# Patient Record
Sex: FEMALE | Race: BLACK OR AFRICAN AMERICAN | Employment: PART TIME | URBAN - METROPOLITAN AREA
[De-identification: names, ages, dates, MRNs, and addresses within clinical notes are randomized per-mention and may not be internally consistent; named-entity substitution may affect disease eponyms.]

---

## 2021-10-12 ENCOUNTER — VIRTUAL VISIT (OUTPATIENT)
Dept: NEUROLOGY | Facility: CLINIC | Age: 21
End: 2021-10-12
Payer: COMMERCIAL

## 2021-10-12 VITALS — HEIGHT: 60 IN | WEIGHT: 136 LBS | BODY MASS INDEX: 26.7 KG/M2

## 2021-10-12 DIAGNOSIS — F07.81 POST CONCUSSION SYNDROME: Primary | ICD-10-CM

## 2021-10-12 PROCEDURE — 99205 OFFICE O/P NEW HI 60 MIN: CPT | Mod: 95 | Performed by: NURSE PRACTITIONER

## 2021-10-12 RX ORDER — NORETHINDRONE ACETATE AND ETHINYL ESTRADIOL 1.5-30(21)
1 KIT ORAL
COMMUNITY
Start: 2021-08-11

## 2021-10-12 RX ORDER — METOCLOPRAMIDE HYDROCHLORIDE 5 MG/5ML
SOLUTION ORAL
COMMUNITY
Start: 2021-09-30

## 2021-10-12 RX ORDER — DEXTROAMPHETAMINE SACCHARATE, AMPHETAMINE ASPARTATE MONOHYDRATE, DEXTROAMPHETAMINE SULFATE AND AMPHETAMINE SULFATE 5; 5; 5; 5 MG/1; MG/1; MG/1; MG/1
CAPSULE, EXTENDED RELEASE ORAL
COMMUNITY
Start: 2021-07-30

## 2021-10-12 ASSESSMENT — MIFFLIN-ST. JEOR: SCORE: 1293.39
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
## 2021-10-12 NOTE — PROGRESS NOTES
Research shows that Ibuprofen can sometimes cause a rebound headache (headache improves for a short time but then comes back worse). Tylenol/Excedrin in studies have shown to be more beneficial. Excedrin does contain caffeine so do not take up to 6 hours before going to sleep     Wearing a baseball hat or any hat with a brim, when outside or inside when there is overhead lighting    Wear blue blocking lenses- glasses that have a blue/pink tint to the lens (can be found at places like [employer], or optical [employer])   There is a Kiosk in the [employer] ([address] [facility]) on the lower level next to the carousel that sells sunglasses 2 pair for 10 dollars. [employer] is a web site wear you can pick out frames and have the tint added to the lens (make sure you are tinting the lenses not the frames)      -a light pair for when inside, looking at a computer or night time driving      - a darker pair for when outside and it is fran    Eye drops - saline solution for dry eyes, put 2 drops in each eye multiple times a day to help with the dry tired eyes    Take out any overhead lighting and replace with lamps    Work in an office or at home when available     Make sure you are taking breaks before symptoms worsen, I suggest a 2-5 minute break every hour and then again if symptoms feel to be worsening. You are better taking shorter breaks more frequently then you are taking fewer long breaks. If you need a hour break or need to take a nap to help your symptoms, you have waited too long to take a break.    Breaks are breaks from stimulation- an area where you can't hear, see, smell, taste or feel anything     Homeopathic Remedies   Sleep-  Melatonin 1-5 mg  Magnesium, 400 mg (will also help with leg cramping)  Valerian Root  Lithium Orotate  CBD oil (careful make sure it does not contain THC)    Pain-  Deep Blue Rub (essential oil lotion, can be purchased off amazon)  CBD lotion    Places to  "purchase CBD oil that other patients have liked are    [employer] [address]  [place], [place]    [employer] (2 locations)      -[address] [place], [place]     -[address], [address]                [place], [place]    [employer]  Order on line      Limit physical activity as well as activities that require a lot of thinking or concentration. These activities can make symptoms worse and recovery time longer. In some cases, your doctor may prescribe time that you completely eliminate these activities to allow complete \"brain rest.\"  Physical activity includes going to the gym, sports practices, weight-training, running, exercising, heavy lifting, etc.    Thinking and concentration activities (e.g., cell phone texting, computer games, movies, parties, loud music and in severe cases may include limiting your time at work).    Drink lots of fluids and eat carbohydrates or protein to main appropriate blood sugar levels.    As symptoms decrease, with consent from your doctor, you may begin to gradually return to your daily activities. If symptoms worsen or return, lessen your activities, then try again to increase your activities gradually.     During recovery, it is normal to feel frustrated and sad when you do not feel right and you can't be as active as usual.    Repeated evaluation of your symptoms is recommended to help guide recovery. Please follow up as recommended by your doctor to ensure a safe and healthy recovery.    Watch for and go to the Emergency Department if you have any of the following symptoms:  Headaches that significantly worsen  Looks very drowsy or can't be awakened  Can't recognize people or places  Worsening neck pain  Seizures  Repeated vomiting  Increasing confusion or irritability  Unusual behavioral change  Slurred speech  Weakness or numbness in arms/legs  Change in state of consciousness    For more information, please visit on the " Internet:  http://www.cdc.gov/concussion/get_help.html   http://www.cdc.gov/concussion/pdf/Facts_about_Concussion_TBI-a.pdf      General Information:  Today you had your appointment with Elba Marroquin CNP     If lab work was done today as part of your evaluation you will generally be contacted via My Chart, mail, or phone with the results within 1-5 days. If there is an alarming result we will contact you by phone. Lab results come back at varying times, I generally wait until all labs are resulted before making comments on results. Please note labs are automatically released to My Chart once available.     If you need refills please contact your pharmacist. They will send a refill request to me to review. Please allow 3 business days for us to process all refill requests.     Please call or send a medical message through My Chart, with any questions or concerns    If you need any paperwork completed please fax forms to [phone]. Please state if you would like a copy of the completed paperwork, mailed or faxed back to the patient and a fax number to fax the paperwork to. Please allow up to 10 days for paperwork to be completed.
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
## 2021-10-12 NOTE — NURSING NOTE
Chief Complaint   Patient presents with     Concussion     Sophia Owens MA on 10/12/2021 at 7:54 AM

## 2025-08-18 ENCOUNTER — APPOINTMENT (OUTPATIENT)
Dept: URBAN - METROPOLITAN AREA CLINIC 252 | Age: 25
Setting detail: DERMATOLOGY
End: 2025-08-18

## 2025-08-18 VITALS — WEIGHT: 140 LBS | HEIGHT: 60 IN | RESPIRATION RATE: 16 BRPM

## 2025-08-18 DIAGNOSIS — L21.8 OTHER SEBORRHEIC DERMATITIS: ICD-10-CM

## 2025-08-18 RX ORDER — KETOCONAZOLE 20 MG/ML
SHAMPOO, SUSPENSION TOPICAL
Qty: 120 | Refills: 3 | Status: ERX | COMMUNITY
Start: 2025-08-18

## 2025-08-18 RX ORDER — CLOBETASOL PROPIONATE 0.5 MG/ML
SOLUTION TOPICAL BID
Qty: 50 | Refills: 1 | Status: ERX | COMMUNITY
Start: 2025-08-18

## 2025-08-18 ASSESSMENT — LOCATION ZONE DERM: LOCATION ZONE: SCALP

## 2025-08-18 ASSESSMENT — LOCATION DETAILED DESCRIPTION DERM: LOCATION DETAILED: MID-OCCIPITAL SCALP

## 2025-08-18 ASSESSMENT — LOCATION SIMPLE DESCRIPTION DERM: LOCATION SIMPLE: POSTERIOR SCALP
